# Patient Record
(demographics unavailable — no encounter records)

---

## 2024-10-18 NOTE — ADDENDUM
[FreeTextEntry1] : This note was written by Kendra Neri on 10/18/2024 acting as a medical scribe for Dr. Young Zimmer MD. All medical entries made by the scribe were at my, Dr. Young Zimmer's, direction and personally dictated by me on 10/18/2024. I have reviewed the chart and agree that the record accurately reflects my personal performance of the history, review of systems, assessment, and plan. I have also personally directed, reviewed, and agreed with the chart.

## 2024-10-18 NOTE — PHYSICAL EXAM
[Normal Gait] : normal gait [Coordination Grossly Intact] : coordination grossly intact [No Focal Deficits] : no focal deficits [Sclera] : the sclera and conjunctiva were normal [PERRL With Normal Accommodation] : pupils were equal in size, round, and reactive to light [Extraocular Movements] : extraocular movements were intact [Outer Ear] : the ears and nose were normal in appearance [Oropharynx] : the oropharynx was normal [Neck Appearance] : the appearance of the neck was normal [Neck Cervical Mass (___cm)] : no neck mass was observed [Jugular Venous Distention Increased] : there was no jugular-venous distention [Thyroid Diffuse Enlargement] : the thyroid was not enlarged [Thyroid Nodule] : there were no palpable thyroid nodules [Auscultation Breath Sounds / Voice Sounds] : lungs were clear to auscultation bilaterally [Heart Rate And Rhythm] : heart rate was normal and rhythm regular [Heart Sounds] : normal S1 and S2 [Heart Sounds Gallop] : no gallops [Murmurs] : no murmurs [Heart Sounds Pericardial Friction Rub] : no pericardial rub [Edema] : there was no peripheral edema [Bowel Sounds] : normal bowel sounds [Abdomen Soft] : soft [Abdomen Tenderness] : non-tender [] : no hepato-splenomegaly [Abdomen Mass (___ Cm)] : no abdominal mass palpated [Cervical Lymph Nodes Enlarged Posterior Bilaterally] : posterior cervical [Cervical Lymph Nodes Enlarged Anterior Bilaterally] : anterior cervical [Supraclavicular Lymph Nodes Enlarged Bilaterally] : supraclavicular [Nail Clubbing] : no clubbing  or cyanosis of the fingernails [Normal Affect] : the affect was normal [Normal Insight/Judgement] : insight and judgment were intact [de-identified] : There is a positive Romberg sign noted [FreeTextEntry1] : Seborrheic keratoses

## 2024-10-18 NOTE — PLAN
[FreeTextEntry1] : 1. Continue current medications as outlined above.  2. The patient will undergo a repeat CAT scan of the chest in January 2025 to re-evaluate previously documented pulmonary nodules.    3. Follow up in 1 year with wellness, routine fasting bloodwork, and EKG.   4. Continue to follow with urologist, Dr. Johnson, for monitoring of enlarged prostate.   5. Follow up with cardiologist, Dr. Venegas for monitoring of HTN and HLD.   6. The patient will be due for a colonoscopy next year, 2025.    7. Cardiovascular exercise as tolerated.

## 2024-10-18 NOTE — HISTORY OF PRESENT ILLNESS
[FreeTextEntry1] :  The patient comes in for a yearly wellness evaluation. [de-identified] : The patient is feeling well at this time. The patient has a history of HTN for which he is maintained on Losartan 50 Mg once daily.  He also has a history of Hyperlipidemia and continues to take Rosuvastatin 5 MG once daily. He is generally tolerating the statin agent well, denying any severe muscle aches or any other overt symptoms. There has been no chest pain, shortness of breath, palpitations, or PND. The patient does c/o a stabbing pain in his left shoulder. The patient reports that he exercises at home.   He underwent a coronary calcium score test in January 2023 which showed several sub 6mm pulmonary nodules. He subsequently underwent a dedicated CAT scan of the chest on 1/30/2023 which revealed multiple pulmonary nodules, none of which were larger than 5 mm. A follow up CT of the chest on 1/17/24 revealed the nodules were stable and unchanged. He has been asymptomatic with regards to cough, sputum production, hemoptysis, or wheeze.  The patient also has a history of an enlarged prostate for which he is maintained on Finasteride 5 MG once daily. He denies any nocturia, urinary frequency, or blood in the urine. He continues to follow with urologist, Dr. Johnson, for monitoring.   The patient underwent cataract surgery of both eyes in May 2024 with Dr. Zamorano. He reports that his vision has greatly improved since the procedure. There have been no complications following the procedure.   The patient c/o a "heaviness" in his legs and feet in the mornings. He states that the sensation dissipates after he stands up. He is up to date on his colonoscopy. He has received the flu and COVID vaccines. There have been no fevers, chills, or night sweats. There have been no other acute constitutional symptoms. He comes in for this assessment.